# Patient Record
(demographics unavailable — no encounter records)

---

## 2018-01-01 NOTE — ER DOCUMENT REPORT
ED Skin Rash/Insect Bite/Abscs





- General


Chief Complaint: Rash


Stated Complaint: RASH,STOMACH PAINS


Time Seen by Provider: 07/25/18 00:03


Mode of Arrival: Carried


Information source: Parent


Notes: 





Parents report rash along patient neck under fold of chin.  Has been there 1-2 

days.  They deny and fever, nausea, vomiting or diarrhea.  Report normal 

appetite and normal wet diapers.  UTD on immunizations.  





- Related Data


Allergies/Adverse Reactions: 


 





No Known Allergies Allergy (Unverified 07/24/18 22:07)


 











Past Medical History





- Social History


Smoking Status: Never Smoker


Chew tobacco use (# tins/day): No


Frequency of alcohol use: None


Drug Abuse: None


Family History: Reviewed & Not Pertinent


Patient has suicidal ideation: No


Patient has homicidal ideation: No





- Medical History


Medical History: Negative


Renal/ Medical History: Denies: Hx Peritoneal Dialysis


Surgical Hx: Negative





- Immunizations


Immunizations up to date: Yes





Review of Systems





- Review of Systems


Constitutional: No symptoms reported


EENT: No symptoms reported


Cardiovascular: No symptoms reported


Respiratory: No symptoms reported


Gastrointestinal: No symptoms reported


Genitourinary: No symptoms reported


Female Genitourinary: No symptoms reported


Musculoskeletal: No symptoms reported


Skin: See HPI


Hematologic/Lymphatic: No symptoms reported


Neurological/Psychological: No symptoms reported





Physical Exam





- Vital signs


Vitals: 


 











Pulse Resp BP Pulse Ox


 


 167 H  30   98/52   100 


 


 07/24/18 22:25  07/24/18 22:25  07/24/18 22:25  07/24/18 22:25














- Notes


Notes: 





PHYSICAL EXAMINATION:





GENERAL: Well-appearing, well-nourished child in no acute distress.





HEAD: Atraumatic, normocephalic.





EYES: Pupils equal round and reactive to light, extraocular movements intact, 

sclera anicteric, conjunctiva are normal. Tears noted





ENT: Nares patent, oropharynx clear without exudates.  Moist mucous membranes.





NECK: Normal range of motion, supple without lymphadenopathy, mild erythemous 

rash to left neck consistent with fungal rash.





LUNGS: Breath sounds clear to auscultation bilaterally and equal.  No wheezes 

rales or rhonchi. No retractions





HEART: Regular rate and rhythm without murmurs





ABDOMEN: Soft, nontender, nondistended abdomen.  No guarding, no rebound.  No 

masses appreciated.





Musculoskeletal: Normal range of motion.  No cyanosis.





NEUROLOGICAL: Cranial nerves grossly intact. Normal sensory, motor, and reflex 

exams.





PSYCH: Normal mood, normal affect.





SKIN: Warm, Dry, normal turgor.





Course





- Re-evaluation


Re-evalutation: 





Well appearing nontoxic infant with complaint of rash to neck.  Parents report 

has been there for one-two days.  No rash anywhere else.  Exam otherwise 

normal.  Parents refusing rectal temperature, axillary temp is 97.7.  Likely 

fungal rash due to appearance.  








- Vital Signs


Vital signs: 


 











Temp Pulse Resp BP Pulse Ox


 


    167 H  30   98/52   100 


 


    07/24/18 22:25  07/24/18 22:25  07/24/18 22:25  07/24/18 22:25














Discharge





- Discharge


Clinical Impression: 


 Fungal dermatitis





Condition: Stable


Disposition: HOME, SELF-CARE


Additional Instructions: 


You are leaving from the emergency department without a thorough workup.  It 

appears that your child's rash is fungal, although we cannot be sure without a 

full and proper assessment of your child's including rectal temperature.  

Please apply the cream to the rash twice per day.  You declined for us to take 

patient's temperature here today so we will need you to follow-up with her 

pediatrician first thing in the morning. 


Prescriptions: 


Nystatin 15 gm TP BID #1 tube


Forms:  Parent Work Note


Referrals: 


PARAG WU MD [Primary Care Provider] - Follow up as needed